# Patient Record
Sex: MALE | Race: OTHER | HISPANIC OR LATINO | URBAN - METROPOLITAN AREA
[De-identification: names, ages, dates, MRNs, and addresses within clinical notes are randomized per-mention and may not be internally consistent; named-entity substitution may affect disease eponyms.]

---

## 2021-01-20 ENCOUNTER — EMERGENCY (EMERGENCY)
Facility: HOSPITAL | Age: 56
LOS: 1 days | Discharge: HOME | End: 2021-01-20
Admitting: EMERGENCY MEDICINE
Payer: COMMERCIAL

## 2021-01-20 VITALS
DIASTOLIC BLOOD PRESSURE: 75 MMHG | HEART RATE: 82 BPM | SYSTOLIC BLOOD PRESSURE: 142 MMHG | TEMPERATURE: 99 F | RESPIRATION RATE: 18 BRPM | OXYGEN SATURATION: 100 %

## 2021-01-20 DIAGNOSIS — Z20.822 CONTACT WITH AND (SUSPECTED) EXPOSURE TO COVID-19: ICD-10-CM

## 2021-01-20 LAB — SARS-COV-2 RNA SPEC QL NAA+PROBE: DETECTED

## 2021-01-20 PROCEDURE — 99283 EMERGENCY DEPT VISIT LOW MDM: CPT

## 2021-01-20 PROCEDURE — U0005: CPT

## 2021-01-20 PROCEDURE — U0003: CPT

## 2021-01-20 NOTE — ED ADULT TRIAGE NOTE - CHIEF COMPLAINT QUOTE
Pt reports subjective fevers overnight, reports feeling well this am. Requests covid swab. Advised to quarantine. [place] to english translation performed by FAYE Barraza who is fluent in Divehi.

## 2021-01-20 NOTE — ED PROVIDER NOTE - PATIENT PORTAL LINK FT
You can access the FollowMyHealth Patient Portal offered by [facility] by registering at the following website: [email or web address]. By joining [employer]’s FollowMyHealth portal, you will also be able to view your health information using other applications (apps) compatible with our system.

## 2021-02-06 ENCOUNTER — EMERGENCY (EMERGENCY)
Facility: HOSPITAL | Age: 56
LOS: 1 days | Discharge: HOME | End: 2021-02-06
Admitting: EMERGENCY MEDICINE
Payer: COMMERCIAL

## 2021-02-06 VITALS
HEART RATE: 78 BPM | TEMPERATURE: 98 F | DIASTOLIC BLOOD PRESSURE: 96 MMHG | OXYGEN SATURATION: 99 % | RESPIRATION RATE: 18 BRPM | SYSTOLIC BLOOD PRESSURE: 145 MMHG

## 2021-02-06 DIAGNOSIS — Z20.822 CONTACT WITH AND (SUSPECTED) EXPOSURE TO COVID-19: ICD-10-CM

## 2021-02-06 PROCEDURE — 99283 EMERGENCY DEPT VISIT LOW MDM: CPT

## 2021-02-06 PROCEDURE — U0005: CPT

## 2021-02-06 PROCEDURE — 99282 EMERGENCY DEPT VISIT SF MDM: CPT

## 2021-02-06 PROCEDURE — U0003: CPT

## 2021-02-06 NOTE — ED PROVIDER NOTE - PATIENT PORTAL LINK FT
You can access the FollowMyHealth Patient Portal offered by [facility] by registering at the following website: [email or web address]. By joining [employer]’s FollowMyHealth portal, you will also be able to view your health information using other applications (apps) compatible with our system.

## 2021-02-06 NOTE — ED ADULT TRIAGE NOTE - CAS ELECT INFOMATION PROVIDED
pt. was seen and evaluated by saúl kelley, pt. verbalized understanding of all follow up instructions./DC instructions

## 2021-02-07 LAB — SARS-COV-2 RNA SPEC QL NAA+PROBE: SIGNIFICANT CHANGE UP

## 2021-04-28 ENCOUNTER — EMERGENCY (EMERGENCY)
Facility: HOSPITAL | Age: 56
LOS: 1 days | Discharge: HOME | End: 2021-04-28
Admitting: EMERGENCY MEDICINE
Payer: COMMERCIAL

## 2021-04-28 VITALS
OXYGEN SATURATION: 98 % | RESPIRATION RATE: 18 BRPM | SYSTOLIC BLOOD PRESSURE: 145 MMHG | HEART RATE: 73 BPM | TEMPERATURE: 98 F | DIASTOLIC BLOOD PRESSURE: 75 MMHG

## 2021-04-28 LAB — SARS-COV-2 RNA SPEC QL NAA+PROBE: SIGNIFICANT CHANGE UP

## 2021-04-28 PROCEDURE — 99283 EMERGENCY DEPT VISIT LOW MDM: CPT

## 2021-04-28 PROCEDURE — U0005: CPT

## 2021-04-28 PROCEDURE — 99282 EMERGENCY DEPT VISIT SF MDM: CPT

## 2021-04-28 PROCEDURE — U0003: CPT

## 2021-04-28 NOTE — ED PROVIDER NOTE - OBJECTIVE STATEMENT
Patient is presenting to the Emergency Department with a request to have COVID-19 testing.  Denies symptoms, including cough, shortness of breath, fever, chills, bodyaches or sore throat. Returned from traveling and needs for work.

## 2021-04-28 NOTE — ED PROVIDER NOTE - PATIENT PORTAL LINK FT
You can access the FollowMyHealth Patient Portal offered by [facility] by registering at the following website: [email or web address]. By joining [employer]’s FollowMyHealth portal, you will also be able to view your health information using other applications (apps) compatible with our system.

## 2021-04-30 DIAGNOSIS — Z20.822 CONTACT WITH AND (SUSPECTED) EXPOSURE TO COVID-19: ICD-10-CM

## 2023-01-17 ENCOUNTER — EMERGENCY (EMERGENCY)
Facility: HOSPITAL | Age: 58
LOS: 1 days | Discharge: HOME | End: 2023-01-17
Admitting: STUDENT IN AN ORGANIZED HEALTH CARE EDUCATION/TRAINING PROGRAM
Payer: COMMERCIAL

## 2023-01-17 VITALS
HEART RATE: 82 BPM | OXYGEN SATURATION: 98 % | DIASTOLIC BLOOD PRESSURE: 92 MMHG | SYSTOLIC BLOOD PRESSURE: 148 MMHG | TEMPERATURE: 98 F | RESPIRATION RATE: 18 BRPM

## 2023-01-17 LAB
FLUAV AG NPH QL: SIGNIFICANT CHANGE UP
FLUBV AG NPH QL: SIGNIFICANT CHANGE UP
RSV RNA NPH QL NAA+NON-PROBE: SIGNIFICANT CHANGE UP
SARS-COV-2 RNA SPEC QL NAA+PROBE: DETECTED

## 2023-01-17 PROCEDURE — 87637 SARSCOV2&INF A&B&RSV AMP PRB: CPT

## 2023-01-17 PROCEDURE — 99285 EMERGENCY DEPT VISIT HI MDM: CPT

## 2023-01-17 PROCEDURE — 99284 EMERGENCY DEPT VISIT MOD MDM: CPT

## 2023-01-17 RX ORDER — ACETAMINOPHEN 500 MG
1000 TABLET ORAL ONCE
Refills: 0 | Status: COMPLETED | OUTPATIENT
Start: 2023-01-17 | End: 2023-01-17

## 2023-01-17 RX ADMIN — Medication 1000 MILLIGRAM(S): at 12:14

## 2023-01-17 NOTE — ED PROVIDER NOTE - CLINICAL SUMMARY MEDICAL DECISION MAKING FREE TEXT BOX
Pt with likely viral syndrome; tested positive for Covid today. Symptoms mild and pt is HDS/euvolemic, very well-appearing with unremarkable exam.  Does not need lab work other than resp viral testing (will do flu, covid, RSV).  Explained that even if our PCR is negative, he still probably has Covid, as he has viral symptoms and false positive results are uncommon.  Advised to go home and isolate, hydrate, treat symptomatically, and return precautions given.

## 2023-01-17 NOTE — ED PROVIDER NOTE - OBJECTIVE STATEMENT
58 yo m c/o muscle aches, headache, subjective fever, dry cough  Duration 3 days, started Sunday. Had a positive home covid test this morning, so he was told by his boss to go to hospital for a PCR.  Denies severe headache, vision changes, throat pain, chest pain, SOB, abd pain, N/V/D.  Admits to mild nasal congestion.  Tried a single dose of Advil yesterday without relief.   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   Pt has had 2 doses of Covid vaccine and says he had flu vaccine this season.   Denies hx of heart disease, lung disease, HTN, HLD, DM.

## 2023-01-17 NOTE — ED PROVIDER NOTE - NS ED MD DISPO DISCHARGE
Physical Therapy Daily Treatment Note/Discharge Note     Name: Lora Goins  Clinic Number: [id number]    Therapy Diagnosis:   Encounter Diagnoses   Name Primary?    Difficulty walking     Weakness     Decreased range of motion of left knee      Physician: Chapito Elkins MD    Visit Date: 9/4/2019    Physician Orders: PT Eval and Treat   Medical Diagnosis from Referral: primary knee OA  Evaluation Date: 8/30/2019  Authorization Period Expiration: 8/14/20  Plan of Care Expiration: 9/6/19  Visit # / Visits authorized: 3/4      Time In: 11  Time Out: 11:40  Total Billable Time: 30 min (TE 2)    Precautions: Standard, s/p L TKA     Subjective     Pt reports: she has been able to use the RW with no difficulty. Increased L knee pain this morning at about a 6/10 but she took her pain medicine before coming to PT.  She was compliant with home exercise program.  Response to previous treatment: none  Functional change: better ambulation with RW    Pain: 1/10 (took pain medication 1 hour before PT)  Location: left knee      Objective   Observation: deconditioned     Posture: kyphotic     Gait: slightly antalgic, increased TKE at heel strike but still requiring VC for this      Active/Passive ROM: (measured in degrees)   Knee     Right 3-0118   Left 5-90      Sensation: intact light touch L2-S1 L side     Lower Extremity Strength (graded 0-5 out of 5)     Right LE Left LE   Hip flexion: 4/5 3/5 *painful   Hip ER: 4-/5     Hip IR: 4-/5     Knee extension:  4/5 4/5 *painful   Ankle dorsiflexion: 4-/5 4/5   Posterior fibers of Gluteus Medius 4/5 4/5   Hip extension: NT NT   Knee flexion:  NT NT   Glute max NT NT   Ankle plantarflexion: NT NT      Function:  · Sit <--> Stand: independent   · Bed Mobility: independent, increased time with sup<> sit to move L leg      Palpation: TTP medial joint line     Flexibility:  mod. tightness B hamstrings       Lora received therapeutic exercises to develop strength, endurance,  ROM and flexibility for 30 minutes including:      Heel slides with flexion holds  Quad sets with towel   Supine clams (R knee fall out)  Glute sets 10 x3      Lora received cold pack for 10 minutes to L knee.      Home Exercises Provided and Patient Education Provided     Education provided:   Edu on importance of HEP. Edu on proper hand placement for RW.    Written Home Exercises Provided: Patient instructed to cont prior HEP.  Exercises were reviewed and Lora was able to demonstrate them prior to the end of the session.  Lora demonstrated good  understanding of the education provided.     See EMR under Patient Instructions for exercises provided prior visit.    Assessment     Pt is progressing towards all goals today. She is able to ambulate with RW without VC or tactile cues and can perform TKE at heel strike without need for VC. Improved use of RW. Increased knee flexion during ambulation as well. Improved LE strength and L knee AROM. Pt is improving towards goals as has improved functional mobility and independence.       Lora is progressing well towards her goals.   Pt prognosis is Good.     Pt will continue to benefit from skilled outpatient physical therapy to address the deficits listed in the problem list box on initial evaluation, provide pt/family education and to maximize pt's level of independence in the home and community environment.     Pt's spiritual, cultural and educational needs considered and pt agreeable to plan of care and goals.     Anticipated barriers to physical therapy: lack of patient follow through    Goals:     Short Term Goals:  1 weeks  1. Pt. to amb. with RW requiring minimal verbal cues from PT for TKE at heel strike- met  2. Pt to report independence with symptom management - in progress  3. Pt to tolerate HEP to improve ROM and independence with ADL's - met     Long Term Goals: 2 weeks  1. Pt. to amb. short distance with RW requiring no correction from PT for TKE at  heel strike- in progress  2. Pt. to obtain TKA protocol and relay to transfer location- in progress (given protocol today)  3. Pt. to demonstrate increased knee AROM to 10-95 deg. to improve ease with transitional activities- in progress    Plan     Pt to be discharged today and return JAIME Dimas to continue PT. [facility] to give to transfer location.     Shanda Eduardo, PT    Home

## 2023-01-17 NOTE — ED PROVIDER NOTE - PHYSICAL EXAMINATION
CONSTITUTIONAL: well-appearing man, NAD   SKIN: Normal color and turgor.    HEAD: NC/AT.  EYES: Conjunctiva clear. EOMI. PERRL.    ENT: Airway clear. Normal voice. No rhinorrhea.  No erythema of throat, no tonsillar swellilng/exudates. Uvula midline without swelling.   RESPIRATORY:  Normal work of breathing. Lungs CTAB.  CARDIOVASCULAR:  RRR, S1S2. No M/R/G.      GI:  Abdomen soft, nontender.    MSK: Neck supple.  No LE edema or calf tenderness. No joint swelling or ROM limitation.  NEURO: Alert; CN: grossly intact. Speech clear.  GRAHAM. Gait steady.

## 2023-01-17 NOTE — ED ADULT NURSE NOTE - OBJECTIVE STATEMENT
58 YO M here for flu like symptoms, fever chills and generalized aches, covid positive at home.  swabbed and sent, administered tylenol for pain.  A&OX4 ambulatory in NAD and stable on room air.

## 2023-01-17 NOTE — ED PROVIDER NOTE - PATIENT PORTAL LINK FT
You can access the FollowMyHealth Patient Portal offered by [facility] by registering at the following website: [email or web address]. By joining [employer]’s FollowMyHealth portal, you will also be able to view your health information using other applications (apps) compatible with our system.

## 2023-01-17 NOTE — ED PROVIDER NOTE - NSFOLLOWUPINSTRUCTIONS_ED_ALL_ED_FT
We are testing you for Flu, Covid-19, and RSV.  Since you had a positive Covid-19 test at home, you probably have Covid-19 EVEN IF THE TEST HERE IS NEGATIVE.  Please isolate at home.  Rest and stay well-hydrated.  You can treat your symptoms (pain/fever) with Tylenol or Advil - use as directed.    Follow up with your primary care physician - call their office to schedule.      Return to the ER if you have new or worsening symptoms, or any concerns.   ----------------------------  Le estamos haciendo pruebas de gripe, Covid-19 y RSV. Ya que tuviste roby prueba de Covid-19 positiva en casa, probablemente tengas Covid-19 AUNQUE LA PRUEBA AQUÍ SEA NEGATIVA. Por favor aíslese en casa. Descansa y mantente tammie hidratado. Puede tratar felicia síntomas (dolor/fiebre) con Tylenol o Advil; úselo según las indicaciones.    Noemi un seguimiento con easley médico de atención primaria: llame a easley oficina para programar.    Regrese a la rock de emergencias si tiene síntomas nuevos o que empeoran, o si tiene alguna inquietud.  ======================    COVID-19 (Enfermedad por coronavirus 2019)    LO QUE NECESITA SABER:    La COVID-19 es la enfermedad causada por el nuevo coronavirus descubierto por primera vez en diciembre de 2019. Los coronavirus generalmente causan infecciones de las vías respiratorias superiores (nariz, garganta y pulmones), kahlil un resfriado. El virus de 2019 se propaga rápida y fácilmente. Puede transmitirse a partir de 2 a 3 días antes de que comiencen los síntomas.    INSTRUCCIONES SOBRE EL NISHI HOSPITALARIA:    Llame al número de emergencias local (911 en los [place]) si:  •Usted tiene dificultad para respirar o falta de aliento mientras descansa.      •Usted siente presión o dolor en el pecho que dura más de 5 minutos.      •Usted tiene confusión o es difícil despertarlo.      •Felicia labios o rodri están azules.      Regrese a la rock de emergencias si:  •Usted tiene fiebre de 104 °F (40 °C) o más.          Llame a easley médico si:  •Usted tiene síntomas de COVID-19.      •Usted tiene preguntas o inquietudes acerca de easley condición o cuidado.      Medicamentos:Es posible que usted necesite alguno de los siguientes:  •Los descongestivosayudan a reducir la congestión nasal y ayudan a respirar más fácilmente. Si toni pastillas descongestivas, pueden causarle agitación o problemas para dormir. No use aerosol descongestionante por más de unos cuantos días.      •Los jarabes para la tosayudan a reducir la tos. Pregúntele a easley médico cuál tipo de medicamento para la tos es mejor para usted.      •Acetaminofénalivia el dolor y baja la fiebre. Está disponible sin receta médica. Pregunte la cantidad y la frecuencia con que debe tomarlos. Siga las indicaciones. Su las etiquetas de todos los demás medicamentos que esté usando para saber si también contienen acetaminofén, o pregunte a easley médico o farmacéutico. El acetaminofén puede causar daño en el hígado cuando no se toni de forma correcta.      •AINEcomo el ibuprofeno, ayudan a disminuir la inflamación, el dolor y la fiebre. Romy medicamento está disponible con o sin roby receta médica. Los ROSEANNA pueden causar sangrado estomacal o problemas renales en ciertas personas. Si usted toni un medicamento anticoagulante, siempre pregúntele a easley médico si los ROSEANNA son seguros para usted. Siempre su la etiqueta de romy medicamento y siga las instrucciones.      •Los anticoagulantes ayudan a evitar los coágulos sanguíneos. Los coágulos pueden ocasionar derrames cerebrales, ataques al corazón y hasta la muerte. Las siguientes son pautas generales de seguridad para seguir mientras está tomando un anticoagulante:?Esté atento a sangrados y hematomas mientras esté tomando anticoagulantes. Esté atento a cualquier sangrado de las encías o nariz. Esté atento a la aparición de mnidi en easley orina y evacuaciones intestinales. Use roby toalla suave para easley piel y un cepillo de dientes de cerdas suaves para cepillarse felicia dientes. [place] puede evitar que easley piel o encías sangren. Si usted se afeita, use roby rasuradora eléctrica. No practique deportes de contacto.      ?Informe a easley odontólogo y otros médicos que usted toni anticoagulantes. Lleve un brazalete o un collar que indique que usted toni romy medicamento.      ?No empiece ni suspenda ningún medicamento, a menos que easley médico se lo indique. Muchos medicamentos no se pueden usar junto con los anticoagulantes.      ?Kerman el anticoagulante exactamente kahlil se lo ordenó easley médico. No omita ninguna dosis ni tome menos de lo indicado. Informe a easley médico inmediatamente si usted olvida mike el anticoagulante o si toni de más.      ?La warfarina es un anticoagulante que usted puede necesitar mike. Usted debería saber lo siguiente en jazmín de que tome warfarina: ?Algunos alimentos y medicamentos pueden afectar la cantidad de warfarina en easley mindi. No realice cambios mayores en easley alimentación mientras toni warfarina. La warfarina funciona mejor si usted ingiere aproximadamente la misma cantidad de vitamina K todos los días. La vitamina K se encuentra en las hortalizas de hoja starr y algunos otros alimentos. Solicite más información acerca de lo que tiene que comer cuando usted toni warfarina.      ?Usted necesitará acudir a consultas de control con easley médico cuando esté tomando warfarina. Deberá hacerse análisis de mindi periódicamente. Estos análisis se usan para determinar la cantidad de medicamento que necesita.        •Kerman felicia medicamentos kahlil se le haya indicado.Consulte con easley médico si usted patrick que easley medicamento no le está ayudando o si presenta efectos secundarios. Infórmele al médico si usted es alérgico a algún medicamento. Mantenga roby lista actualizada de los medicamentos, las vitaminas y los productos herbales que toni. Incluya los siguientes datos de los medicamentos: cantidad, frecuencia y motivo de administración. Traiga con usted la lista o los envases de las píldoras a felicia citas de seguimiento. Lleve la lista de los medicamentos con usted en jazmín de roby emergencia.      Lo que necesita saber acerca de las variantes:El virus ha cambiado en varias formas nuevas (llamadas variantes) desde que se descubrió. Las variantes pueden ser más contagiosas (se propagan fácilmente) que la forma original. Además, algunas pueden causar roby enfermedad más grave que otras.    Lo que necesita saber acerca de las vacunas contra la COVID-19:Los médicos recomiendan la vacuna contra la COVID-19, incluso si ya tuvo COVID-19. Se considera que usted está completamente vacunado contra la COVID-19 dos semanas después de la última dosis de cualquier vacuna contra COVID-19. Informe a easley médico cuando haya recibido la última dosis de la vacuna. Noemi roby copia de easley tarjeta de vacunación. Conserve el original en jzamín de que tenga que mostrarlo. Mantenga la copia en un lugar seguro.  •Reciba la vacuna contra la COVID-19 según las indicaciones.Se recomienda la vacunación a todas las personas de 6 meses o más. Las vacunas contra la COVID-19 se administran en forma de inyección en 1 a 3 dosis primarias. [place] depende de la bear de la vacuna y de la edad de la persona que la recibe. Se recomienda roby dosis de refuerzo para todas las personas de 5 años o más después de completar la serie primaria. Se recomienda roby segunda dosis refuerzo para todos los adultos de 50 años o más y para los adolescentes inmunodeprimidos. La segunda dosis de refuerzo también se recomienda para todos los que recibieron la bear de la vacuna de 1 dosis kahlil la primera dosis y un refuerzo. Easley médico puede darle más información sobre los refuerzos y ayudarlo a programar todas las dosis necesarias.  Calendario de vacunación recomendado contra la COVID-19           •Continúe con el distanciamiento social y otras medidas.Puede contagiarse incluso después de recibir la vacuna. También puede transmitir el virus a otras personas sin saber que tiene la infección.      •Después de vacunarse, compruebe las normas de viaje locales, nacionales e internacionales.Es posible que tenga que hacerse la prueba antes de viajar. Algunos países exigen evidencia de que la prueba es negativa antes de viajar. John vez también sea necesario hacer cuarentena tras easley regreso.      •Se pueden administrar medicamentos para evitar roby infección.Los medicamentos pueden administrarse si tiene un alto riesgo de infección y no puede recibir la vacuna. También pueden administrarse si easley sistema inmunitario no responde tammie a la vacuna.      Cómo se propaga el coronavirus 2019:  •Las gotitas son la principal forma de propagación de todos los coronavirus.El virus viaja en las gotitas que se enzo cuando roby persona habla, canta, tose o estornuda. Las gotitas también pueden flotar en el aire por minutos u horas. La infección se produce cuando respira las gotitas o cuando le tocan los ojos o la nariz. El contacto personal cercano con roby persona infectada aumenta el riesgo de infección. [place] significa estar a menos de 6 pies (2 metros) de distancia de la persona lynnette al menos 15 minutos en 24 horas.      •El contacto de persona a persona puede propagar el virus.Por ejemplo, roby persona con el virus en felicia rowdy puede propagarlo al darle la mano a alguien.      •El virus puede permanecer en objetos y superficies hasta 3 días.Puede infectarse si toca el objeto o la superficie y luego se toca los ojos o la boca.      Ayude a reducir el riesgo de COVID-19:  •Lávese las rowdy con frecuencia lynnette el día.Utilice agua y jabón. Frótese las rowdy enjabonadas, entrelazando los dedos, lynnette al menos 20 segundos. Enjuáguese con agua corriente caliente. Séquese las rowdy con roby toalla limpia o roby toalla de papel. Puede usar un desinfectante para rowdy que contenga alcohol, si no hay agua y jabón disponibles. Enseñe a los niños a lavarse las rowdy y a usar el desinfectante de rowdy.  Lavado de rowdy           •Cúbrase al toser y estornudar.Gire la rodri y cúbrase la boca y la nariz con un pañuelo. Deseche el pañuelo. Use el ángulo del brazo si no tiene un pañuelo disponible. Luego lávese las rowdy con agua y jabón o use un desinfectante de rowdy. Enséñeles a los niños a cubrirse al toser o estornudar.      •Use un tapabocas (máscara) cuando sea necesario.Utilice un tapabocas de khadijah con al menos 2 capas. También puede crear capas colocando un tapabocas de khadijah sobre roby máscara no médica desechable. Cúbrase la boca y la nariz.  Cómo usar un tapabocas (mascarilla)           •Siga las pautas de distanciamiento social a nivel local, nacional y mundial.Mantenga al menos 6 pies (2 metros) de distancia entre usted y los demás.      •Trate de no tocarse la rodri.Si tiene el virus en las rowdy, puede transferirlo a los ojos, la nariz o la boca e infectarse. También puede transferirlo a los objetos, las superficies o las personas.      •Limpie y desinfecte a menudo los objetos y las superficies de alto contacto.Use toallitas húmedas desinfectantes o noemi roby solución de 4 cucharaditas de lejía en 1 cuarto de galón (4 tazas) de agua.      •Pregunte sobre otras vacunas que usted puede necesitar.Debe recibir la vacuna contra la influenza (gripe) tan pronto kahlil se lo recomienden cada año, generalmente en septiembre u octubre. Vacúnese contra la neumonía si se recomienda. Easley médico puede indicarle si también debe recibir otras vacunas, y cuándo aplicárselas.    Prevenga la infección por COVID-19         Siga las pautas de distanciamiento social:Las normas de distanciamiento social nacionales y locales varían. Las reglas y restricciones pueden cambiar con el tiempo a medida que se levantan las restricciones. Las siguientes son reglas generales al respecto:  •Quédese en casa si está enfermo o patrick que puede tener COVID-19.Es importante que se quede en casa si está esperando roby gael para roby prueba o los resultados de roby prueba.      •Evite el contacto físico cercano con nadie que no viva en easley casa.No le dé la mano, abrace o bese a roby persona kahlil saludo. Si tiene que usar el transporte público (kahlil el autobús o el metro), intente sentarse o pararse lejos de los demás. Use el tapabocas.      •Evite las reuniones en persona y las multitudes.Asista virtualmente, si es posible.      Acuda a la consulta de control con easley médico según las indicaciones:Anote felicia preguntas para que se acuerde de hacerlas lynnette felicia visitas.    Para más información:  •Centers for Disease Control and Prevention  [address] [place] [address]  Phone: 1-706.323.9860  Web Address: http://www.cdc.gov

## 2023-01-18 DIAGNOSIS — M79.10 MYALGIA, UNSPECIFIED SITE: ICD-10-CM

## 2023-01-18 DIAGNOSIS — R51.9 HEADACHE, UNSPECIFIED: ICD-10-CM

## 2023-01-18 DIAGNOSIS — U07.1 COVID-19: ICD-10-CM
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
